# Patient Record
Sex: FEMALE | Race: WHITE
[De-identification: names, ages, dates, MRNs, and addresses within clinical notes are randomized per-mention and may not be internally consistent; named-entity substitution may affect disease eponyms.]

---

## 2019-08-22 NOTE — ER DOCUMENT REPORT
Entered by CARA PETIT SCRIBE  08/22/19 0924 





Acting as scribe for:SIA SUAREZ MD





ED General





- General


Chief Complaint: Lower Abdominal Pain


Stated Complaint: ABDOMIAL PAIN


Time Seen by Provider: 08/22/19 09:17


Notes: 





Patient is a 18-year-old female presenting to the emergency department 

complaining of abdominal pain.  Patient states that the pain began yesterday, 

while she was lying down watching TV.  Patient describes the pain as "muscle 

spasms" or harsh cramping.  Patient states that the pain is constant, she went 

to urgent care for the pain and they told her to go get an ultrasound done.  

Patient states that she has concern of a UTI, she has been experiencing normal 

vaginal discharge, burning, and some nausea.  Patient states that her last 

menstrual period was about 2 weeks ago on August 8.  Patient denies experiencing

any vaginal discharge, vomiting, recent traumas, falls, or having pain like this

before.


TRAVEL OUTSIDE OF THE [place] IN LAST 30 DAYS: No





- Related Data


Allergies/Adverse Reactions: 


                                        





No Known Allergies Allergy (Verified 08/22/19 08:50)


   











Past Medical History





- General


Information source: Patient





- Social History


Smoking Status: Never Smoker


Cigarette use (# per day): No


Chew tobacco use (# tins/day): No


Frequency of alcohol use: None


Drug Abuse: None


Family History: Reviewed & Not Pertinent


Patient has suicidal ideation: No - [facility] [place]


Patient has homicidal ideation: No


Pulmonary Medical History: Reports: Hx Asthma


Musculoskeletal Medical History: Reports Hx Musculoskeletal Trauma


Psychiatric Medical History: Reports: Hx Attention Deficit Hyperactivity 

Disorder, Hx Bipolar Disorder, Hx Depression, Hx Post Traumatic Stress Disorder


Past Surgical History: Reports: Hx Oral Surgery - wisdom, Hx Tonsillectomy





- Immunizations


Immunizations up to date: Yes


Hx Diphtheria, Pertussis, Tetanus Vaccination: Yes





Review of Systems





- Review of Systems


Constitutional: No symptoms reported


EENT: No symptoms reported


Cardiovascular: No symptoms reported


Respiratory: No symptoms reported


Gastrointestinal: See HPI, Abdominal pain, Nausea.  denies: Vomiting


Genitourinary: See HPI, Burning


Female Genitourinary: See HPI, Vaginal discharge


Musculoskeletal: No symptoms reported


Skin: No symptoms reported


Hematologic/Lymphatic: No symptoms reported


Neurological/Psychological: No symptoms reported


-: Yes All other systems reviewed and negative





Physical Exam





- Vital signs


Vitals: 


                                        











Temp Pulse Resp BP Pulse Ox


 


 97.3 F   108 H  16   139/74 H  99 


 


 08/22/19 08:52  08/22/19 08:52  08/22/19 08:52  08/22/19 08:52  08/22/19 08:52














- Notes


Notes: 





Physical Exam:


 


General: Alert, appears well. 


 


HEENT: Normocephalic. Atraumatic. PERRL. Extraocular movements intact. 

Oropharynx clear.


 


Neck: Supple. Non-tender.


 


Respiratory: No respiratory distress. Clear and equal breath sounds bilaterally.


 


Cardiovascular: Regular rate and rhythm. 


 


Abdominal: Normal Inspection. Non-tender. No distension. Normal Bowel Sounds.  

No rebound, no guarding.


 


Back: Non-tender. No deformity or step off.


 


Extremities: Moves all four extremities.


Upper extremities: Normal inspection. Normal ROM.  


Lower extremities: Normal inspection. No edema. Normal ROM.


 


Neurological: Normal cognition. AAOx4. Normal speech.  


 


Psychological: Normal affect. Normal Mood. 


 


Skin: Warm. Dry. Normal color.





Course





- Re-evaluation


Re-evalutation: 





08/22/19 11:05


Patient left the emergency department on her own will saying that it was taking 

too long for her ultrasound.  I was not able to talk to her she left prior to me

being informed.





- Vital Signs


Vital signs: 


                                        











Temp Pulse Resp BP Pulse Ox


 


 97.3 F   108 H  16   139/74 H  99 


 


 08/22/19 08:52  08/22/19 08:52  08/22/19 08:52  08/22/19 08:52  08/22/19 08:52














- Laboratory


Laboratory results interpreted by me: 


                                        











  08/22/19





  09:29


 


Ur Leukocyte Esterase  TRACE H














Discharge





- Discharge


Clinical Impression: 


Abdominal pain


Qualifiers:


 Abdominal location: right upper quadrant Qualified Code(s): R10.11 - Right 

upper quadrant pain





Condition: Good


Disposition: ELOPED





I personally performed the services described in the documentation, reviewed and

edited the documentation which was dictated to the scribe in my presence, and it

accurately records my words and actions.

## 2019-08-23 NOTE — ER DOCUMENT REPORT
ED Medical Screen (RME)





- General


Chief Complaint: Abdominal Pain


Stated Complaint: STOMACH PAIN


Time Seen by Provider: 08/23/19 17:22


Mode of Arrival: Ambulatory


Information source: Patient


Notes: 





18-year-old female presented to ED for complaint of abdominal pain x3 days with 

vomiting starting yesterday.  States she is can keep fluids down but not food.  

She states she is vomited 3 times since yesterday.  She denies any diarrhea.  

She denies any fever.  States her last menstrual cycle was a month ago.  She 

states that she has had some urinary frequency and urgency but that they have a 

urine yesterday and it was negative.  She states she has had vaginal discharge 

but it is white.  She states the pain is on the pelvis and the left abdomen.  

She states she used vapor but does not smoke cigarettes drink or do any drugs.














I have greeted and performed a rapid initial assessment of this patient.  A 

comprehensive ED assessment and evaluation of the patient, analysis of test 

results and completion of medical decision making process will be conducted by 

an additional ED providers.


TRAVEL OUTSIDE OF THE [place] IN LAST 30 DAYS: No





- Related Data


Allergies/Adverse Reactions: 


                                        





No Known Allergies Allergy (Verified 08/22/19 08:50)


   











Past Medical History





- Social History


Chew tobacco use (# tins/day): No


Frequency of alcohol use: None


Drug Abuse: None





- Past Medical History


Cardiac Medical History: 


   Denies: Hx Heart Attack, Hx Hypertension


Pulmonary Medical History: Reports: Hx Asthma


Neurological Medical History: Denies: Hx Cerebrovascular Accident, Hx Seizures


Renal/ Medical History: Denies: Hx Peritoneal Dialysis


GI Medical History: Denies: Hx Hepatitis, Hx Hiatal Hernia, Hx Ulcer


Musculoskeltal Medical History: Reports Hx Musculoskeletal Trauma


Psychiatric Medical History: Reports: Hx Attention Deficit Hyperactivity 

Disorder, Hx Bipolar Disorder, Hx Depression, Hx Post Traumatic Stress Disorder


Infectious Medical History: Denies: Hx Hepatitis


Past Surgical History: Reports: Hx Oral Surgery - wisdom, Hx Tonsillectomy.  

Denies: Hx Mastectomy, Hx Open Heart Surgery, Hx Pacemaker





- Immunizations


Immunizations up to date: Yes


Hx Diphtheria, Pertussis, Tetanus Vaccination: Yes





Physical Exam





- Vital signs


Vitals: 





                                        











Temp Pulse Resp BP Pulse Ox


 


 97.9 F   105 H  12   124/75   97 


 


 08/23/19 17:10  08/23/19 17:10  08/23/19 17:10  08/23/19 17:10  08/23/19 17:10














Course





- Vital Signs


Vital signs: 





                                        











Temp Pulse Resp BP Pulse Ox


 


 97.9 F   105 H  12   124/75   97 


 


 08/23/19 17:10  08/23/19 17:10  08/23/19 17:10  08/23/19 17:10  08/23/19 17:10

## 2019-08-23 NOTE — ER DOCUMENT REPORT
ED General





- General


Chief Complaint: Abdominal Pain


Stated Complaint: STOMACH PAIN


Time Seen by Provider: 08/23/19 17:22


Primary Care Provider: 


BRANDI CINTRON MD [ACTIVE STAFF] - Follow up in 3-5 days


Mode of Arrival: Ambulatory


Information source: Patient, [facility]


Notes: 





18-year-old female with asthma, depression, bipolar disorder presents with 3 

days of left lower quadrant pain that she describes as sharp, intermittent and 

without radiation.  Patient states that she did have associated nausea and 

vomiting that has resolved.  She denies any dysuria, hematuria, vaginal 

discharge.  She is sexually active with her  only and has no concern for 

STD.  She does report recent STD testing in [place].  Patient does report a 

family history of ovarian cysts.  Patient did come to the emergency department 

yesterday but left prior to completion of her evaluation because she stated that

it was taking too long for the ultrasound to be completed.


TRAVEL OUTSIDE OF THE [place] IN LAST 30 DAYS: No





- HPI


Onset: Other


Onset/Duration: Gradual, Intermittent, Better


Quality of pain: Sharp


Severity: Mild


Associated symptoms: Nausea - Resolved, Vomiting - Resolved.  denies: Chest 

pain, Diarrhea, Fever, Shortness of breath, Sweating


Exacerbated by: Denies


Relieved by: Denies


Similar symptoms previously: Yes


Recently seen / treated by doctor: No





- Related Data


Allergies/Adverse Reactions: 


                                        





No Known Allergies Allergy (Verified 08/22/19 08:50)


   











Past Medical History





- General


Information source: Patient





- Social History


Smoking Status: Never Smoker


Chew tobacco use (# tins/day): No


Frequency of alcohol use: None


Drug Abuse: None


Lives with: Spouse/Significant other


Family History: Reviewed & Not Pertinent


Patient has suicidal ideation: No


Patient has homicidal ideation: No





- Past Medical History


Cardiac Medical History: 


   Denies: Hx Heart Attack, Hx Hypertension


Pulmonary Medical History: Reports: Hx Asthma


Neurological Medical History: Denies: Hx Cerebrovascular Accident, Hx Seizures


Renal/ Medical History: Denies: Hx Peritoneal Dialysis


GI Medical History: Denies: Hx Hepatitis, Hx Hiatal Hernia, Hx Ulcer


Musculoskeletal Medical History: Reports Hx Musculoskeletal Trauma


Psychiatric Medical History: Reports: Hx Attention Deficit Hyperactivity 

Disorder, Hx Bipolar Disorder, Hx Depression, Hx Post Traumatic Stress Disorder


Infectious Medical History: Denies: Hx Hepatitis


Past Surgical History: Reports: Hx Oral Surgery - wisdom, Hx Tonsillectomy.  

Denies: Hx Mastectomy, Hx Open Heart Surgery, Hx Pacemaker





- Immunizations


Immunizations up to date: Yes


Hx Diphtheria, Pertussis, Tetanus Vaccination: Yes





Review of Systems





- Review of Systems


Notes: 





REVIEW OF SYSTEMS:


CONSTITUTIONAL :  Denies fever,  chills, or sweats.  Denies recent illness. 

Denies weight loss, recent hospitalizations. 


EENT: Denies visual changes, eye pain.  Denies sore throat, oral lesions, 

difficulty swallowing.


CARDIOVASCULAR:  Denies chest pain.  Denies palpitations. Denies lower extremity

edema.


RESPIRATORY:  Denies cough. Denies shortness of breath, wheezing.


GASTROINTESTINAL:  Denies abdominal  distention.  Denies  diarrhea.  Denies 

blood in vomitus, stools, or per rectum.  Denies black, tarry stools.  Denies 

constipation.  


GENITOURINARY:  Denies difficulty urinating, painful urination,  frequency, 

blood in urine, or  vaginal discharge.


MUSCULOSKELETAL:  Denies back or neck pain or stiffness.  Denies joint pain or 

swelling.


SKIN:   Denies rash, lesions or sores.


HEMATOLOGIC :   Denies easy bruising or bleeding.


LYMPHATIC:  Denies swollen glands.


NEUROLOGICAL:  Denies confusion or altered mental status.  Denies loss of 

consciousness.  Denies dizziness or lightheadedness.  Denies headache.  Denies 

weakness or paralysis.  Denies problems difficulty with ambulation, slurred 

speech.  Denies sensory loss, numbness, or tingling.  Denies seizures.


PSYCHIATRIC:  Denies anxiety or stress.  Denies depression, suicidal ideation, 

or homicidal ideation.  Denies visual or auditory hallucinations.








Physical Exam





- Vital signs


Vitals: 


                                        











Temp Pulse Resp BP Pulse Ox


 


 97.9 F   105 H  12   124/75   97 


 


 08/23/19 17:10  08/23/19 17:10  08/23/19 17:10  08/23/19 17:10  08/23/19 17:10














- Notes


Notes: 





PHYSICAL EXAMINATION:





GENERAL: Well-appearing, well-nourished and in no acute distress.





HEAD: Atraumatic, normocephalic.





EYES: Pupils equal round and reactive to light, extraocular movements intact, 

conjunctiva are normal.





ENT: Nares patent, oropharynx clear without exudates.  Moist mucous membranes.





NECK: Normal range of motion, supple without lymphadenopathy





LUNGS: Breath sounds clear to auscultation bilaterally and equal.  No wheezes 

rales or rhonchi.





HEART: Regular rate and rhythm without murmurs





ABDOMEN: Soft, tenderness with palpation to the left lower quadrant, n

ondistended abdomen.  No guarding, no rebound.  No masses appreciated.





Female : Declined





Musculoskeletal: Normal range of motion, no pitting or edema.  No cyanosis.





NEUROLOGICAL: Cranial nerves grossly intact.  Normal speech, normal gait.  

Normal sensory, motor exams





PSYCH: Normal mood, normal affect.





SKIN: Warm, Dry, normal turgor, no rashes or lesions noted.





Course





- Re-evaluation


Re-evalutation: 





08/23/19 20:21





Laboratory











  08/23/19 08/23/19 08/23/19





  17:30 17:40 17:40


 


WBC   13.1 H 


 


RBC   4.89 


 


Hgb   14.4 


 


Hct   43.6 


 


MCV   89 


 


MCH   29.5 


 


MCHC   33.1 


 


RDW   13.3 


 


Plt Count   286 


 


Lymph % (Auto)   26.0 


 


Mono % (Auto)   7.8 


 


Eos % (Auto)   1.6 


 


Baso % (Auto)   0.6 


 


Absolute Neuts (auto)   8.4 H 


 


Absolute Lymphs (auto)   3.4 


 


Absolute Monos (auto)   1.0 


 


Absolute Eos (auto)   0.2 


 


Absolute Basos (auto)   0.1 


 


Seg Neutrophils %   64.0 


 


Sodium    138.7


 


Potassium    4.3


 


Chloride    104


 


Carbon Dioxide    27


 


Anion Gap    8


 


BUN    14


 


Creatinine    0.73


 


Est GFR ( Amer)    > 60


 


Est GFR (MDRD) Non-Af    > 60


 


Glucose    70 L


 


Calcium    9.1


 


Total Bilirubin    0.3


 


Direct Bilirubin    0.2


 


Neonat Total Bilirubin    Not Reportable


 


Neonat Direct Bilirubin    Not Reportable


 


Neonat Indirect Bili    Not Reportable


 


AST    22


 


ALT    15


 


Alkaline Phosphatase    55


 


Total Protein    6.2 L


 


Albumin    3.8


 


Beta HCG, Quant    < 2.39


 


Total Beta HCG    NEGATIVE


 


Urine Color  YELLOW  


 


Urine Appearance  CLEAR  


 


Urine pH  5.0  


 


Ur Specific Gravity  1.027  


 


Urine Protein  NEGATIVE  


 


Urine Glucose (UA)  NEGATIVE  


 


Urine Ketones  NEGATIVE  


 


Urine Blood  NEGATIVE  


 


Urine Nitrite  NEGATIVE  


 


Urine Bilirubin  NEGATIVE  


 


Urine Urobilinogen  NEGATIVE  


 


Ur Leukocyte Esterase  TRACE H  


 


Urine WBC (Auto)  17  


 


Urine RBC (Auto)  1  


 


Squamous Epi Cells Auto  <1  


 


Urine Mucus (Auto)  FEW  


 


Urine Ascorbic Acid  NEGATIVE  











                                        





Transvaginal US  08/23/19 17:23


IMPRESSION:  3.1 cm complex left ovarian cyst, possibly hemorrhagic.


 











                                        











Temp Pulse Resp BP Pulse Ox


 


 97.9 F   105 H  12   124/75   97 


 


 08/23/19 17:10  08/23/19 17:10  08/23/19 17:10  08/23/19 17:10  08/23/19 17:10








18-year-old female presents with left lower quadrant abdominal pain for 3 days. 

Vitals were reviewed upon arrival and patient is mildly tachycardic but 

afebrile.  She does not appear toxic or dehydrated.  She is in no acute 

distress.  Exam is significant for mild tenderness with palpation to the left 

lower quadrant without guarding or rebound.  CBC, CMP, urinalysis and pregnancy 

tests are unremarkable.  Transvaginal ultrasound was obtained and does show a 

3.1 cm complex left ovarian cyst which is possibly hemorrhagic.  Patient did 

receive IM Toradol.  She states she has nausea medication at home.  She was 

provided copies of her ultrasound and instructed that she must follow-up with 

OB/GYN.  Patient was discharged home with instructions to take Motrin, Norco for

severe pain.  Patient is agreeable and requesting discharge home as she states 

that "I am very hungry and tired of being here".








Patient was evaluated and treated as appropriate for the patient's presenting 

symptoms and complaint, with consideration of any critical or life threatening 

conditions that may be associated with their obtained history and exam as noted 

above. All results were discussed with patient. Patient provided the opportunity

to ask questions, and express concerns. Patient was educated on treatments based

on their presumed diagnosis as noted above.  At this time we will discharge the 

patient with return precautions and follow-up recommendations.  Verbal discharge

instructions given a the bedside. Medication warnings reviewed. Patient is in 

agreement with this plan and has verbalized understanding of return precautions.







After careful consideration I feel that that patient can be safely discharged 

from the emergency department,  they were advised to followup with a primary 

care physician in 2-3 days. 








Dictation on this chart was performed using voice recognition software and may 

result in unintended grammatical, spelling, syntax or errors.

















- Vital Signs


Vital signs: 


                                        











Temp Pulse Resp BP Pulse Ox


 


 97.9 F   105 H  12   124/75   97 


 


 08/23/19 17:10  08/23/19 17:10  08/23/19 17:10  08/23/19 17:10  08/23/19 17:10














- Laboratory


Result Diagrams: 


                                 08/23/19 17:40





                                 08/23/19 17:40


Laboratory results interpreted by me: 


                                        











  08/23/19 08/23/19 08/23/19





  17:30 17:40 17:40


 


WBC   13.1 H 


 


Absolute Neuts (auto)   8.4 H 


 


Glucose    70 L


 


Total Protein    6.2 L


 


Ur Leukocyte Esterase  TRACE H  














- Diagnostic Test


Radiology reviewed: Image reviewed, Reports reviewed





Discharge





- Discharge


Clinical Impression: 


 Left lower quadrant abdominal pain, Hemorrhagic cyst of left ovary





Condition: Good


Disposition: HOME, SELF-CARE


Instructions:  Abdominal Pain ([facility]), Nausea or Vomiting, Nonspecific ([facility]), 

Ovarian Cyst ([facility]), Toradol Injection ([facility])


Additional Instructions: 


Today been diagnosed with an ovarian cyst.  These typically occur in the middle 

of your typical menstrual cycle.  The pain should last for no more than 3-4 

days.  For your pain: Take ibuprofen 600 mg and acetaminophen 1000 mg every 6 

hours together as needed for pain.  If this does not control your pain you may 

take 1 Norco every 6 hours hours as needed.  Please be very careful about using 

the oral Norco and only use this for severe pain.  Please follow-up with your 

OB/GYN regarding today's visit.  If you have multiple recurrent cyst that 

continue to cause you pain like this, you may require hormone therapy such as 

oral birth control pills to prevent recurrence of the same.  Return if you 

develop fever, nausea, vomiting, worsening abdominal pain, pass out, or have any

other symptoms that are worrisome to you.


Prescriptions: 


Hydrocodone/Acetaminophen [Norco 5-325 mg Tablet] 1 tab PO Q6H #10 tablet


Forms:  Smoking Cessation Education


Referrals: 


BRANDI CINTRON MD [ACTIVE STAFF] - Follow up in 3-5 days

## 2019-08-23 NOTE — RADIOLOGY REPORT (SQ)
EXAM DESCRIPTION:  U/S NON-OB PELVIS TV W/O DOP



COMPLETED DATE/TIME:  8/23/2019 6:11 pm



REASON FOR STUDY:  bilateral pelvic pain



COMPARISON:  None.



TECHNIQUE:  Dynamic and static grayscale images acquired of the pelvis via transvaginal approach and 
recorded on PACS. Additional selected color Doppler and spectral images recorded.



LIMITATIONS:  None.



FINDINGS:  UTERUS: Contour normal.  No mass.

ENDOMETRIAL STRIPE: No focal or generalized thickening. No masses.

CERVIX: No nabothian cysts.

RIGHT OVARY AND DOPPLER: Normal size.  2.7 cm cyst. Normal arterial vascular flow without evidence fo
r torsion.

LEFT OVARY AND DOPPLER: Normal size.  3.1 cm complex cyst, possibly hemorrhagic.  Normal arterial vas
cular flow without evidence for torsion.

FREE FLUID: None noted.

OTHER: No other significant finding.

MEASUREMENTS:

UTERUS: 7.2 x 4.3 x 3.2 cm

ENDOMETRIAL STRIPE: 7 mm

RIGHT OVARY: 3.5 x 4.1 x 3.1 cm

LEFT OVARY: 3.5 x 3.1 x 3.1 cm



IMPRESSION:  3.1 cm complex left ovarian cyst, possibly hemorrhagic.



TECHNICAL DOCUMENTATION:  JOB ID:  [id number]

[id number]

 2011 [employer]- All Rights Reserved                          Rev-[id number]



Reading location - IP/workstation name: [email or web address]

## 2020-01-02 ENCOUNTER — HOSPITAL ENCOUNTER (EMERGENCY)
Dept: HOSPITAL 62 - ER | Age: 20
LOS: 1 days | Discharge: HOME | End: 2020-01-03
Payer: MEDICAID

## 2020-01-02 DIAGNOSIS — Z32.02: ICD-10-CM

## 2020-01-02 DIAGNOSIS — K76.0: ICD-10-CM

## 2020-01-02 DIAGNOSIS — E66.9: ICD-10-CM

## 2020-01-02 DIAGNOSIS — N91.1: Primary | ICD-10-CM

## 2020-01-02 DIAGNOSIS — J45.909: ICD-10-CM

## 2020-01-02 DIAGNOSIS — R10.11: ICD-10-CM

## 2020-01-02 LAB
ADD MANUAL DIFF: NO
ALBUMIN SERPL-MCNC: 4.8 G/DL (ref 3.5–5)
ALP SERPL-CCNC: 70 U/L (ref 38–126)
ANION GAP SERPL CALC-SCNC: 12 MMOL/L (ref 5–19)
APPEARANCE UR: (no result)
APTT PPP: YELLOW S
AST SERPL-CCNC: 29 U/L (ref 14–36)
BASOPHILS # BLD AUTO: 0 10^3/UL (ref 0–0.2)
BASOPHILS NFR BLD AUTO: 0.4 % (ref 0–2)
BILIRUB DIRECT SERPL-MCNC: 0.3 MG/DL (ref 0–0.4)
BILIRUB SERPL-MCNC: 0.3 MG/DL (ref 0.2–1.3)
BILIRUB UR QL STRIP: NEGATIVE
BUN SERPL-MCNC: 21 MG/DL (ref 7–20)
CALCIUM: 9.9 MG/DL (ref 8.4–10.2)
CHLORIDE SERPL-SCNC: 101 MMOL/L (ref 98–107)
CO2 SERPL-SCNC: 28 MMOL/L (ref 22–30)
EOSINOPHIL # BLD AUTO: 0.1 10^3/UL (ref 0–0.6)
EOSINOPHIL NFR BLD AUTO: 0.9 % (ref 0–6)
ERYTHROCYTE [DISTWIDTH] IN BLOOD BY AUTOMATED COUNT: 13.5 % (ref 11.5–14)
GLUCOSE SERPL-MCNC: 82 MG/DL (ref 75–110)
GLUCOSE UR STRIP-MCNC: NEGATIVE MG/DL
HCT VFR BLD CALC: 41.3 % (ref 36–47)
HGB BLD-MCNC: 14 G/DL (ref 12–15.5)
KETONES UR STRIP-MCNC: NEGATIVE MG/DL
LYMPHOCYTES # BLD AUTO: 2.4 10^3/UL (ref 0.5–4.7)
LYMPHOCYTES NFR BLD AUTO: 25.1 % (ref 13–45)
MCH RBC QN AUTO: 29.6 PG (ref 27–33.4)
MCHC RBC AUTO-ENTMCNC: 34 G/DL (ref 32–36)
MCV RBC AUTO: 87 FL (ref 80–97)
MONOCYTES # BLD AUTO: 0.7 10^3/UL (ref 0.1–1.4)
MONOCYTES NFR BLD AUTO: 7.5 % (ref 3–13)
NEUTROPHILS # BLD AUTO: 6.4 10^3/UL (ref 1.7–8.2)
NEUTS SEG NFR BLD AUTO: 66.1 % (ref 42–78)
PH UR STRIP: 6 [PH] (ref 5–9)
PLATELET # BLD: 263 10^3/UL (ref 150–450)
POTASSIUM SERPL-SCNC: 4.6 MMOL/L (ref 3.6–5)
PROT SERPL-MCNC: 8 G/DL (ref 6.3–8.2)
PROT UR STRIP-MCNC: 30 MG/DL
RBC # BLD AUTO: 4.73 10^6/UL (ref 3.72–5.28)
SP GR UR STRIP: 1.03
TOTAL CELLS COUNTED % (AUTO): 100 %
UROBILINOGEN UR-MCNC: NEGATIVE MG/DL (ref ?–2)
WBC # BLD AUTO: 9.6 10^3/UL (ref 4–10.5)

## 2020-01-02 PROCEDURE — 99284 EMERGENCY DEPT VISIT MOD MDM: CPT

## 2020-01-02 PROCEDURE — 85025 COMPLETE CBC W/AUTO DIFF WBC: CPT

## 2020-01-02 PROCEDURE — 84702 CHORIONIC GONADOTROPIN TEST: CPT

## 2020-01-02 PROCEDURE — 76705 ECHO EXAM OF ABDOMEN: CPT

## 2020-01-02 PROCEDURE — 36415 COLL VENOUS BLD VENIPUNCTURE: CPT

## 2020-01-02 PROCEDURE — 80053 COMPREHEN METABOLIC PANEL: CPT

## 2020-01-02 PROCEDURE — 81001 URINALYSIS AUTO W/SCOPE: CPT

## 2020-01-02 PROCEDURE — 83690 ASSAY OF LIPASE: CPT

## 2020-01-02 PROCEDURE — 96360 HYDRATION IV INFUSION INIT: CPT

## 2020-01-02 NOTE — ER DOCUMENT REPORT
ED Medical Screen (RME)





- General


Chief Complaint: Vomiting


Stated Complaint: VOMITING


Time Seen by Provider: 01/02/20 21:11


TRAVEL OUTSIDE OF THE [place] IN LAST 30 DAYS: No





- HPI


Notes: 





01/02/20 21:14


Patient is a 19-year-old female no significant past medical history presents 

complaining of nausea, vomiting, right upper quadrant abdominal pain that began 

over the past several days.  Patient states that she is 2 months late on her 

menstrual cycle and had questionable urine pregnancy test at the health 

department.  Denies drug allergies.  No fever.





I have treated and performed a rapid initial assessment of this patient.  A 

comprehensive ED assessment and evaluation of the patient, analysis of test 

results and completion of medical decision making process will be conducted by 

additional ED providers.





PHYSICAL EXAMINATION:





GENERAL: Well-appearing, well-nourished and in no acute distress.  A&Ox4.  

Answers questions appropriately.


Abdomen: Limited exam in triage, mild tenderness right upper qu

adrant/epigastrium. No CVA tenderness.





- Related Data


Allergies/Adverse Reactions: 


                                        





No Known Allergies Allergy (Verified 01/02/20 21:09)


   











Past Medical History





- Past Medical History


Cardiac Medical History: 


   Denies: Hx Heart Attack, Hx Hypertension


Pulmonary Medical History: Reports: Hx Asthma


Neurological Medical History: Denies: Hx Cerebrovascular Accident, Hx Seizures


Renal/ Medical History: Denies: Hx Peritoneal Dialysis


GI Medical History: Denies: Hx Hepatitis, Hx Hiatal Hernia, Hx Ulcer


Musculoskeltal Medical History: Reports Hx Musculoskeletal Trauma


Psychiatric Medical History: Reports: Hx Attention Deficit Hyperactivity 

Disorder, Hx Bipolar Disorder, Hx Depression, Hx Post Traumatic Stress Disorder


Infectious Medical History: Denies: Hx Hepatitis


Past Surgical History: Reports: Hx Oral Surgery - wisdom, Hx Tonsillectomy.  

Denies: Hx Mastectomy, Hx Open Heart Surgery, Hx Pacemaker





- Immunizations


Immunizations up to date: Yes


Hx Diphtheria, Pertussis, Tetanus Vaccination: Yes





Physical Exam





- Vital signs


Vitals: 





                                        











Temp Pulse Resp BP Pulse Ox


 


 97.8 F   108 H  16   149/72 H  98 


 


 01/02/20 19:31  01/02/20 19:31  01/02/20 19:31  01/02/20 19:31  01/02/20 19:31














Course





- Vital Signs


Vital signs: 





                                        











Temp Pulse Resp BP Pulse Ox


 


 97.8 F   108 H  16   149/72 H  98 


 


 01/02/20 19:31  01/02/20 19:31  01/02/20 19:31  01/02/20 19:31  01/02/20 19:31

## 2020-01-02 NOTE — RADIOLOGY REPORT (SQ)
CLINICAL HISTORY:  RUQ pain 



COMPARISON: None.



TECHNIQUE: US ABDOMEN LIMITED on 1/2/2020 9:13 PM CST



FINDINGS: 



Liver is fatty in echotexture. Common bile but measures 4 mm.

Portal vein is patent. Gallbladder is slightly contracted without

wall thickening, stones or pericholecystic fluid. Right kidney

measures 9.7 cm.



IMPRESSION: 



Hepatic steatosis. Otherwise unremarkable study.

## 2020-01-03 VITALS — SYSTOLIC BLOOD PRESSURE: 118 MMHG | DIASTOLIC BLOOD PRESSURE: 62 MMHG

## 2020-01-03 NOTE — ER DOCUMENT REPORT
ED General





- General


Chief Complaint: Abdominal Pain


Stated Complaint: VOMITING


Time Seen by Provider: 01/02/20 21:11


TRAVEL OUTSIDE OF THE [place] IN LAST 30 DAYS: No





- HPI


Notes: 





Ms. Gordon is a 19-year-old nulligravida who has not had a menstrual period in 

2 months.  She went to the health department and was told that she had 

"indeterminate" pregnancy test.  She basically came in here tonight because she 

went to find out if she was pregnant or not.  She reports that she has been 

having some unusual cravings for food.  She is not on any contraception.  Takes 

no regular medications.  No known allergies.  Says she gets some occasional 

bloating and indigestion.  She has intermittently complained of some cramping in

right upper quadrant.











- Related Data


Allergies/Adverse Reactions: 


                                        





No Known Allergies Allergy (Verified 01/02/20 22:35)


   











Past Medical History





- General


Information source: Patient, Relative





- Social History


Smoking Status: Never Smoker


Family History: Reviewed & Not Pertinent


Patient has suicidal ideation: No


Patient has homicidal ideation: No





- Past Medical History


Cardiac Medical History: 


   Denies: Hx Heart Attack, Hx Hypertension


Pulmonary Medical History: Reports: Hx Asthma


Neurological Medical History: Denies: Hx Cerebrovascular Accident, Hx Seizures


Renal/ Medical History: Denies: Hx Peritoneal Dialysis


GI Medical History: Denies: Hx Hepatitis, Hx Hiatal Hernia, Hx Ulcer


Musculoskeletal Medical History: Reports Hx Musculoskeletal Trauma


Psychiatric Medical History: Reports: Hx Attention Deficit Hyperactivity 

Disorder, Hx Bipolar Disorder, Hx Depression, Hx Post Traumatic Stress Disorder


Infectious Medical History: Denies: Hx Hepatitis


Past Surgical History: Reports: Hx Oral Surgery - wisdom, Hx Tonsillectomy.  

Denies: Hx Mastectomy, Hx Open Heart Surgery, Hx Pacemaker





- Immunizations


Immunizations up to date: Yes


Hx Diphtheria, Pertussis, Tetanus Vaccination: Yes





Review of Systems





- Review of Systems


Notes: 





Constitutional: Negative for fever.


HENT: Negative for sore throat.


Eyes: Negative for visual changes.


Cardiovascular: Negative for chest pain.


Respiratory: Negative for shortness of breath.


Gastrointestinal: As per HPI.


Genitourinary: As per HPI.


Musculoskeletal: Negative for back pain.


Skin: Negative for rash.


Neurological: Negative for headaches, weakness or numbness.





10 point ROS negative except as marked above and in HPI.








Physical Exam





- Vital signs


Vitals: 





                                        











Temp Pulse Resp BP Pulse Ox


 


 97.8 F   108 H  16   149/72 H  98 


 


 01/02/20 19:31  01/02/20 19:31  01/02/20 19:31  01/02/20 19:31  01/02/20 19:31














- Notes


Notes: 











GENERAL: Somewhat obese female of approximately stated age well-developed well-

nourished appearing in no acute distress.





SKIN: Good turgor no rashes.





HEAD: Normocephalic atraumatic.





EYES: PERRLA.  EOMI.  Conjunctivae and sclerae clear.





EARS: CANALS AND TMS CLEAR.





NOSE: CLEAR.





MOUTH: Moist mucosa.  Good dentition.  No stridor or edema.  No drooling.





NECK: Supple.  No masses or thyromegaly.  No adenopathy.  Carotids 2+ without 

bruits.  No JVD.





BACK: Symmetrical without tenderness.





CHEST: Respirations unlabored.  Breath sounds clear and symmetrical.





HEART: Regular rhythm.  No murmur gallop or rub.





ABDOMEN: Obese.  Soft nontender without masses, organomegaly or rebound.  Bowel 

sounds normally active.  No bruits.





GENITALIA: Deferred.





EXTREMITIES: No edema.  No calf tenderness.  Cap refill less than 1.5 seconds.  

Dorsalis pedis and posterior tibial pulses 3+ and symmetrical.





NEUROLOGICAL: GCS 15.  Alert and oriented x3.  Normal gait.  Fluent speech.  

Cranial nerves II through XII intact.  Sensorimotor and cerebellar normal.  

Normal tone.





PSYCHIATRIC: Appropriate affect.





Course





- Re-evaluation


Re-evalutation: 





01/03/20 00:05


Pregnancy test is negative here.  Abdominal ultrasound has been ordered by the 

midlevel provider for saw this patient this was reported by the radiologist as 

showing some hepatic steatosis.  I talked to the patient and her male  

and recommended outpatient follow-up with a gynecologist regarding her secondary

amenorrhea.





- Vital Signs


Vital signs: 





                                        











Temp Pulse Resp BP Pulse Ox


 


 97.8 F   108 H  16   149/72 H  98 


 


 01/02/20 19:31  01/02/20 19:31  01/02/20 19:31  01/02/20 19:31  01/02/20 19:31














- Laboratory


Result Diagrams: 


                                 01/02/20 21:35





                                 01/02/20 21:35


Laboratory results interpreted by me: 





                                        











  01/02/20 01/02/20





  21:35 21:35


 


BUN  21 H 


 


Urine Protein   30 H


 


Leukocyte Esterase Rfl   TRACE H














Discharge





- Discharge


Clinical Impression: 


 Secondary amenorrhea





Condition: Stable


Disposition: HOME, SELF-CARE


Additional Instructions: 


Follow-up with referral gynecologist.


Referrals: 


KENZIE CASTILLO MD [ACTIVE STAFF] - Follow up as needed

## 2020-01-19 ENCOUNTER — HOSPITAL ENCOUNTER (EMERGENCY)
Dept: HOSPITAL 62 - ER | Age: 20
Discharge: HOME | End: 2020-01-19
Payer: MEDICAID

## 2020-01-19 VITALS — DIASTOLIC BLOOD PRESSURE: 75 MMHG | SYSTOLIC BLOOD PRESSURE: 109 MMHG

## 2020-01-19 DIAGNOSIS — J45.909: ICD-10-CM

## 2020-01-19 DIAGNOSIS — F17.200: ICD-10-CM
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
## 2020-01-19 DIAGNOSIS — M25.531: Primary | ICD-10-CM

## 2020-01-19 DIAGNOSIS — I10: ICD-10-CM

## 2020-01-19 PROCEDURE — L3908 WHO COCK-UP NONMOLDE PRE OTS: HCPCS

## 2020-01-19 PROCEDURE — 99283 EMERGENCY DEPT VISIT LOW MDM: CPT

## 2020-01-19 PROCEDURE — 73110 X-RAY EXAM OF WRIST: CPT

## 2020-01-19 NOTE — RADIOLOGY REPORT (SQ)
EXAM DESCRIPTION:  WRIST RIGHT 3 VIEWS



COMPLETED DATE/TIME:  1/19/2020 6:33 pm



REASON FOR STUDY:  rt ulnar wrist pain s/p injury



COMPARISON:  None.



EXAM PARAMETERS:  NUMBER OF VIEWS: Three views.

TECHNIQUE: AP, lateral and oblique  radiographic images acquired of the right wrist.

LIMITATIONS: None.



FINDINGS:  MINERALIZATION: Normal.

BONES: No acute fracture or dislocation.  No worrisome bone lesions.

JOINTS: No effusion.

SOFT TISSUES: No significant soft tissue swelling.  No radiopaque foreign body.

OTHER: No other significant finding.



IMPRESSION:  NO FRACTURE.



TECHNICAL DOCUMENTATION:  JOB ID:  [id number]

[id number]

 2011 [employer]- All Rights Reserved



Reading location - IP/workstation name: [employer]

## 2020-01-19 NOTE — ER DOCUMENT REPORT
HPI





- HPI


Time Seen by Provider: 01/19/20 18:03


Pain Level: 1


Notes: 





Patient is a 19-year-old female no significant past medical history aside from 

asthma who presents complaining of right wrist pain status post injury 

yesterday.  Patient states that she hit her wrist off of a desk.  She has had 

some pain in that area since then but does not radiate.  Denies drug allergies. 

She does not want any Tylenol or Motrin at this time.  Denies any headache, 

fever, URI, sore throat, chest pain, palpitations, syncope, cough, shortness of 

breath, wheeze, dyspnea, abdominal pain, nausea/vomiting/diarrhea, urinary 

retention, dysuria, hematuria, loss of control of bowel or bladder, 

numbness/tingling, muscle paralysis/weakness, or rash.





- ROS


Systems Reviewed and Negative: Yes All other systems reviewed and negative





- REPRODUCTIVE


Reproductive: DENIES: Pregnant:





Past Medical History





- Social History


Smoking Status: Current Some Day Smoker


Family History: Reviewed & Not Pertinent


Patient has suicidal ideation: No


Patient has homicidal ideation: No





- Past Medical History


Cardiac Medical History: 


   Denies: Hx Heart Attack, Hx Hypertension


Pulmonary Medical History: Reports: Hx Asthma


Neurological Medical History: Denies: Hx Cerebrovascular Accident, Hx Seizures


Renal/ Medical History: Denies: Hx Peritoneal Dialysis


GI Medical History: Denies: Hx Hepatitis, Hx Hiatal Hernia, Hx Ulcer


Musculoskeletal Medical History: Reports Hx Musculoskeletal Trauma


Psychiatric Medical History: Reports: Hx Attention Deficit Hyperactivity 

Disorder, Hx Bipolar Disorder, Hx Depression, Hx Post Traumatic Stress Disorder


Infectious Medical History: Denies: Hx Hepatitis


Past Surgical History: Reports: Hx Oral Surgery - wisdom, Hx Tonsillectomy.  

Denies: Hx Mastectomy, Hx Open Heart Surgery, Hx Pacemaker





- Immunizations


Immunizations up to date: Yes


Hx Diphtheria, Pertussis, Tetanus Vaccination: Yes





Vertical Provider Document





- CONSTITUTIONAL


Agree With Documented VS: Yes


Notes: 





PHYSICAL EXAMINATION:





GENERAL: Well-appearing, well-nourished and in no acute distress.





HEAD: Atraumatic, normocephalic.





NECK: Normal range of motion, supple without lymphadenopathy.  No midline 

tenderness.





LUNGS: Breath sounds clear to auscultation bilaterally and equal.  No wheezes 

rales or rhonchi.





HEART: Regular rate and rhythm without murmurs, rubs, gallops.





Musculoskeletal: Rt hand/wrist: No erythema, warmth, ecchymosis, deformity, or 

swelling noted.  N/V intact distal.  FROM to passive/active at the wrist. 

Strength 5+/5 to .  No scaphoid tenderness.  + tenderness distal ulnar wrist

to palp.  No other bony tenderness.  Gamekeeper negative.





Extremities:  No cyanosis, clubbing, or edema b/l.  Peripheral pulses 2+.  

Capillary refill less than 3 seconds.





NEUROLOGICAL: Normal speech, normal gait.  Normal sensory, motor exams otherwise

unremarkable





PSYCH: Normal mood, normal affect.





SKIN: see above.  No rash





- INFECTION CONTROL


TRAVEL OUTSIDE OF THE [place] IN LAST 30 DAYS: No





Course





- Re-evaluation


Re-evalutation: 





01/19/20 


Patient is an afebrile, well-hydrated, 19-year-old female who presents to the ED

with rt wrist pain which I suspect to be a contusion.  Vitals are acceptable 

without any significant tachycardia, tachypnea, or hypoxia.  PE is otherwise 

unremarkable for any neurovascular compromise, obvious tendon/ligament rupture, 

obvious fracture/dislocation, septic joint.  X-ray was unremarkable for any 

acute pathology.  Cock up splint provided today.  Patient declined any Tylenol 

or ice.  Patient is nontoxic-appearing.  No other labs or imaging warranted at 

this time based on H&P.  Conservative measures otherwise for symptoms.  Recheck 

with your PCM in 3-5 days.  Consider consult orthopedics.  Return to the ED with

any worsening/concerning symptoms otherwise as reviewed in discharge.  Patient 

is in agreement.





- Vital Signs


Vital signs: 


                                        











Temp Pulse Resp BP Pulse Ox


 


 98.4 F   95   20   109/75   100 


 


 01/19/20 17:48  01/19/20 17:48  01/19/20 17:48  01/19/20 17:48  01/19/20 17:48














Discharge





- Discharge


Clinical Impression: 


 Right wrist pain





Condition: Stable


Disposition: HOME, SELF-CARE


Additional Instructions: 


Rest, Ice, Compression, Elevation


Tylenol/ibuprofen as needed


Light stretches daily


Strength exercises as able


Moist heat and massage may help


F/u with your PCP in 3-5 days for a recheck


Consider consult(s) with Orthopedics/physical therapy for ongoing/worsening 

symptoms





Return to the ED with any worsening symptoms and/or development of fever, 

headache, chest pain, palpitations, syncope, shortness of breath, trouble 

breathing, abdominal pain, n/v/d, muscle weakness/paralysis, numbness/tingling, 

swelling, redness, or other worsening symptoms that are concerning to you.


Referrals: 


[facility] [Provider Group] - Follow up as needed

## 2020-03-21 ENCOUNTER — HOSPITAL ENCOUNTER (EMERGENCY)
Dept: HOSPITAL 62 - ER | Age: 20
Discharge: HOME | End: 2020-03-21
Payer: MEDICAID

## 2020-03-21 VITALS — SYSTOLIC BLOOD PRESSURE: 128 MMHG | DIASTOLIC BLOOD PRESSURE: 77 MMHG

## 2020-03-21 DIAGNOSIS — J45.909: ICD-10-CM

## 2020-03-21 DIAGNOSIS — N94.0: Primary | ICD-10-CM

## 2020-03-21 DIAGNOSIS — F17.210: ICD-10-CM

## 2020-03-21 LAB
APPEARANCE UR: CLEAR
APTT PPP: YELLOW S
BILIRUB UR QL STRIP: NEGATIVE
GLUCOSE UR STRIP-MCNC: NEGATIVE MG/DL
KETONES UR STRIP-MCNC: NEGATIVE MG/DL
NITRITE UR QL STRIP: NEGATIVE
PH UR STRIP: 7 [PH] (ref 5–9)
PROT UR STRIP-MCNC: 30 MG/DL
SP GR UR STRIP: 1.03
UROBILINOGEN UR-MCNC: NEGATIVE MG/DL (ref ?–2)

## 2020-03-21 PROCEDURE — 81025 URINE PREGNANCY TEST: CPT

## 2020-03-21 PROCEDURE — 81001 URINALYSIS AUTO W/SCOPE: CPT

## 2020-03-21 PROCEDURE — 99284 EMERGENCY DEPT VISIT MOD MDM: CPT

## 2020-03-21 NOTE — ER DOCUMENT REPORT
HPI





- HPI


Time Seen by Provider: 03/21/20 17:59


Onset: Last week


Onset/Duration: Waxing and waning


Quality of pain: Achy


Associated Symptoms: None


Notes: 





This 19-year-old female presented to the emergency room today stating that she 

last had a full menstrual period November she spotted in January she has not had

a menstrual period since she is historically regular on her menstrual.  States s

he followed up with her OB/GYN in January and has not followed up since.





- REPRODUCTIVE


Reproductive: DENIES: Pregnant:





Past Medical History





- Social History


Smoking Status: Current Every Day Smoker


Cigarette use (# per day): Yes


Chew tobacco use (# tins/day): No


Smoking Education Provided: No


Family History: Reviewed & Not Pertinent





- Past Medical History


Cardiac Medical History: 


   Denies: Hx Heart Attack, Hx Hypertension


Pulmonary Medical History: Reports: Hx Asthma


Neurological Medical History: Denies: Hx Cerebrovascular Accident, Hx Seizures


Renal/ Medical History: Denies: Hx Peritoneal Dialysis


GI Medical History: Denies: Hx Hepatitis, Hx Hiatal Hernia, Hx Ulcer


Musculoskeletal Medical History: Reports Hx Musculoskeletal Trauma


Psychiatric Medical History: Reports: Hx Attention Deficit Hyperactivity 

Disorder, Hx Bipolar Disorder, Hx Depression, Hx Post Traumatic Stress Disorder


Infectious Medical History: Denies: Hx Hepatitis


Past Surgical History: Reports: Hx Oral Surgery - wisdom, Hx Tonsillectomy.  

Denies: Hx Mastectomy, Hx Open Heart Surgery, Hx Pacemaker





- Immunizations


Immunizations up to date: Yes


Hx Diphtheria, Pertussis, Tetanus Vaccination: Yes





Vertical Provider Document





- CONSTITUTIONAL


Exam Limitations: No Limitations





- INFECTION CONTROL


TRAVEL OUTSIDE OF THE [place] IN LAST 30 DAYS: No





- HEENT


HEENT: Atraumatic, Conjuctival Injection, Normocephalic, PERRLA





- NECK


Neck: Normal Inspection





- RESPIRATORY


Respiratory: Breath Sounds Normal





- CARDIOVASCULAR


Cardiovascular: Regular Rate, Regular Rhythm





Course





- Vital Signs


Vital signs: 


                                        











Temp Pulse Resp BP Pulse Ox


 


 97.8 F   93   18   128/77 H  98 


 


 03/21/20 17:59  03/21/20 17:59  03/21/20 17:59  03/21/20 17:59  03/21/20 17:59














- Laboratory


Laboratory results interpreted by me: 





03/21/20 18:55


                              Labs- All tests 24 hr











  03/21/20





  18:23


 


Urine Color  YELLOW


 


Urine Appearance  CLEAR


 


Urine pH  7.0


 


Ur Specific Gravity  1.030


 


Urine Protein  30 H


 


Urine Glucose (UA)  NEGATIVE


 


Urine Ketones  NEGATIVE


 


Urine Blood  NEGATIVE


 


Urine Nitrite  NEGATIVE


 


Urine Bilirubin  NEGATIVE


 


Urine Urobilinogen  NEGATIVE


 


Ur Leukocyte Esterase  NEGATIVE


 


Urine WBC (Auto)  2


 


Urine RBC (Auto)  2


 


Squamous Epi Cells Auto  1


 


Urine Mucus (Auto)  MANY


 


Urine Ascorbic Acid  NEGATIVE


 


Urine HCG, Qual  NEGATIVE














Discharge





- Discharge


Clinical Impression: 


 Mittelschmerz phenomenon





Condition: Good


Disposition: HOME, SELF-CARE


Additional Instructions: 


Must follow-up with OB/GYN in 2 to 3 days.


Increase fluid intake rest return to the emergency room for any change worsening

condition.


Medicine as prescribed.  Return for any change worsening condition.


Prescriptions: 


Naproxen Sodium [Naproxen Sodium ER] 500 mg PO Q12 PRN #20 tablet.sa


 PRN Reason:

## 2020-04-01 ENCOUNTER — HOSPITAL ENCOUNTER (EMERGENCY)
Dept: HOSPITAL 62 - ER | Age: 20
Discharge: HOME | End: 2020-04-01
Payer: MEDICAID

## 2020-04-01 VITALS — DIASTOLIC BLOOD PRESSURE: 73 MMHG | SYSTOLIC BLOOD PRESSURE: 120 MMHG

## 2020-04-01 DIAGNOSIS — H10.11: Primary | ICD-10-CM

## 2020-04-01 DIAGNOSIS — J45.909: ICD-10-CM

## 2020-04-01 DIAGNOSIS — H57.11: ICD-10-CM

## 2020-04-01 PROCEDURE — 99282 EMERGENCY DEPT VISIT SF MDM: CPT

## 2020-04-01 NOTE — ER DOCUMENT REPORT
ED General





- General


Mode of Arrival: Ambulatory


Information source: Patient


TRAVEL OUTSIDE OF THE [place] IN LAST 30 DAYS: No





- General


Chief Complaint: Eye Problem


Stated Complaint: EYE IRRITATION


Time Seen by Provider: 04/01/20 08:44


Primary Care Provider: 


CHUCK NIELSEN MD [ACTIVE STAFF] - Follow up in 3-5 days





- Related Data


Allergies/Adverse Reactions: 


                                        





No Known Allergies Allergy (Verified 04/01/20 08:31)


   











Past Medical History





- Social History


Smoking Status: Never Smoker


Chew tobacco use (# tins/day): No


Frequency of alcohol use: None


Drug Abuse: None


Family History: Reviewed & Not Pertinent


Patient has suicidal ideation: No


Patient has homicidal ideation: No





- Past Medical History


Cardiac Medical History: 


   Denies: Hx Heart Attack, Hx Hypertension


Pulmonary Medical History: Reports: Hx Asthma


Neurological Medical History: Denies: Hx Cerebrovascular Accident, Hx Seizures


Renal/ Medical History: Denies: Hx Peritoneal Dialysis


GI Medical History: Denies: Hx Hepatitis, Hx Hiatal Hernia, Hx Ulcer


Musculoskeletal Medical History: Reports Hx Musculoskeletal Trauma


Psychiatric Medical History: Reports: Hx Attention Deficit Hyperactivity 

Disorder, Hx Bipolar Disorder, Hx Depression, Hx Post Traumatic Stress Disorder


Infectious Medical History: Denies: Hx Hepatitis


Past Surgical History: Reports: Hx Oral Surgery - wisdom, Hx Tonsillectomy.  

Denies: Hx Mastectomy, Hx Open Heart Surgery, Hx Pacemaker





- Immunizations


Immunizations up to date: Yes


Hx Diphtheria, Pertussis, Tetanus Vaccination: Yes





Physical Exam





- Vital signs


Vitals: 





                                        











Temp Pulse Resp BP Pulse Ox


 


 98.2 F   101 H  18   140/79 H  100 


 


 04/01/20 08:16  04/01/20 08:16  04/01/20 08:16  04/01/20 08:16  04/01/20 08:16














Course





- Vital Signs


Vital signs: 





                                        











Temp Pulse Resp BP Pulse Ox


 


 98.2 F   101 H  18   140/79 H  100 


 


 04/01/20 08:16  04/01/20 08:16  04/01/20 08:16  04/01/20 08:16  04/01/20 08:16














Discharge





- Discharge


Clinical Impression: 


Allergic conjunctivitis


Qualifiers:


 Laterality: right Qualified Code(s): H10.11 - Acute atopic conjunctivitis, 

right eye





Condition: Stable


Disposition: HOME, SELF-CARE


Instructions:  Conjunctivitis, Allergic


Additional Instructions: 


Your exam is most consistent with allergic conjunctivitis.  At this time I have 

given you a prescription for erythromycin gel.  If the white part of your eye 

becomes distinctly more red you need to stop going to work and start using the 

prescription.  If your eye stays the same as it is now you do not need to use 

the medication and you are safe to go to work.


Prescriptions: 


Erythromycin Base in Ethanol [Erythromycin 2% Gel] 1 applic OD Q4 5 Days #30 

gel..gm.


Forms:  Return to Work


Referrals: 


CHUCK NIELSEN MD [ACTIVE STAFF] - Follow up in 3-5 days

## 2020-04-01 NOTE — ER DOCUMENT REPORT
ED Eye Complaint





- General


Chief Complaint: Eye Problem


Stated Complaint: EYE IRRITATION


Time Seen by Provider: 04/01/20 08:44


Mode of Arrival: Ambulatory


Information source: Patient


TRAVEL OUTSIDE OF THE [place] IN LAST 30 DAYS: No





- HPI


Notes: 





Patient presents stating that she has had some eye pain and itchiness to the 

right eye.  This been going on for several days.  Nothing makes it better or 

worse.  She fears that this is allergies.  She states there is no significant 

radiation of the pain the pain is mild and intermittent.  She states she was sen

t here by her boss to make sure that she did not have "pinkeye".  No other 

symptoms.





- Related Data


Allergies/Adverse Reactions: 


                                        





No Known Allergies Allergy (Verified 04/01/20 08:31)


   











Past Medical History





- General


Information source: Patient





- Social History


Smoking Status: Never Smoker


Chew tobacco use (# tins/day): No


Frequency of alcohol use: None


Drug Abuse: None


Family History: Reviewed & Not Pertinent


Patient has suicidal ideation: No


Patient has homicidal ideation: No





- Past Medical History


Cardiac Medical History: 


   Denies: Hx Heart Attack, Hx Hypertension


Pulmonary Medical History: Reports: Hx Asthma


Neurological Medical History: Denies: Hx Cerebrovascular Accident, Hx Seizures


Renal/ Medical History: Denies: Hx Peritoneal Dialysis


GI Medical History: Denies: Hx Hepatitis, Hx Hiatal Hernia, Hx Ulcer


Musculoskeletal Medical History: Reports Hx Musculoskeletal Trauma


Psychiatric Medical History: Reports: Hx Attention Deficit Hyperactivity 

Disorder, Hx Bipolar Disorder, Hx Depression, Hx Post Traumatic Stress Disorder


Infectious Medical History: Denies: Hx Hepatitis


Past Surgical History: Reports: Hx Oral Surgery - wisdom, Hx Tonsillectomy.  

Denies: Hx Mastectomy, Hx Open Heart Surgery, Hx Pacemaker





- Immunizations


Immunizations up to date: Yes


Hx Diphtheria, Pertussis, Tetanus Vaccination: Yes





Review of Systems





- Review of Systems


Constitutional: denies: Chills, Fever


EENT: Eye pain, Eye discharge


Cardiovascular: denies: Chest pain, Palpitations


Respiratory: denies: Cough, Short of breath





Physical Exam





- Vital signs


Vitals: 





                                        











Temp Pulse Resp BP Pulse Ox


 


 98.2 F   101 H  18   140/79 H  100 


 


 04/01/20 08:16  04/01/20 08:16  04/01/20 08:16  04/01/20 08:16  04/01/20 08:16











Interpretation: Normal





- General


General appearance: Appears well


In distress: None





- HEENT


Head: Normocephalic, Atraumatic


Eyes: Normal


Conjunctiva: Normal


Cornea: Normal


Extraocular movements intact: Yes


Eyelashes: Normal


Pupils: PERRL





- Respiratory


Respiratory status: No respiratory distress


Breath sounds: Normal





- Psychological


Associated symptoms: Normal affect, Normal mood





- Skin


Skin Temperature: Warm


Skin Moisture: Dry


Skin Color: Normal





Course





- Vital Signs


Vital signs: 





                                        











Temp Pulse Resp BP Pulse Ox


 


 98.2 F   101 H  18   140/79 H  100 


 


 04/01/20 08:16  04/01/20 08:16  04/01/20 08:16  04/01/20 08:16  04/01/20 08:16














Discharge





- Discharge


Clinical Impression: 


Allergic conjunctivitis


Qualifiers:


 Laterality: right Qualified Code(s): H10.11 - Acute atopic conjunctivitis, 

right eye





Condition: Stable


Disposition: HOME, SELF-CARE


Instructions:  Conjunctivitis, Allergic


Additional Instructions: 


Your exam is most consistent with allergic conjunctivitis.  At this time I have 

given you a prescription for erythromycin gel.  If the white part of your eye 

becomes distinctly more red you need to stop going to work and start using the 

prescription.  If your eye stays the same as it is now you do not need to use 

the medication and you are safe to go to work.


Prescriptions: 


Erythromycin Base in Ethanol [Erythromycin 2% Gel] 1 applic OD Q4 5 Days #30 

gel..gm.


Forms:  Return to Work


Referrals: 


CHUCK NIELSEN MD [ACTIVE STAFF] - Follow up in 3-5 days

## 2020-04-04 ENCOUNTER — HOSPITAL ENCOUNTER (EMERGENCY)
Dept: HOSPITAL 62 - ER | Age: 20
LOS: 1 days | Discharge: HOME | End: 2020-04-05
Payer: MEDICAID

## 2020-04-04 VITALS — DIASTOLIC BLOOD PRESSURE: 70 MMHG | SYSTOLIC BLOOD PRESSURE: 119 MMHG

## 2020-04-04 DIAGNOSIS — S80.01XA: Primary | ICD-10-CM

## 2020-04-04 DIAGNOSIS — F17.200: ICD-10-CM
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
## 2020-04-04 DIAGNOSIS — M79.604: ICD-10-CM

## 2020-04-04 PROCEDURE — 99283 EMERGENCY DEPT VISIT LOW MDM: CPT

## 2020-04-04 PROCEDURE — 73590 X-RAY EXAM OF LOWER LEG: CPT

## 2020-04-04 PROCEDURE — 73564 X-RAY EXAM KNEE 4 OR MORE: CPT

## 2020-04-04 NOTE — RADIOLOGY REPORT (SQ)
EXAM DESCRIPTION: Two views of the right tibia and fibula



CLINICAL HISTORY: 19 years Female, right lower extremity pain

post into the side of a car. Pain.



COMPARISON: None.



FINDINGS: Bone mineralization is normal. The tibia and fibula are

intact. No fracture is seen. The knee and ankle are unremarkable.

Soft tissues are unremarkable.



IMPRESSION:

Normal radiographs of the right tibia and fibula.

## 2020-04-04 NOTE — RADIOLOGY REPORT (SQ)
EXAM DESCRIPTION: Four views of the right knee



CLINICAL HISTORY: 19 years Female, right lower extremity pain

post into the side of the vehicle. Anterior knee pain.



COMPARISON: None.



FINDINGS: Bone mineralization is normal. Alignment of the knee is

anatomic. No fracture. No degenerative arthritis. No effusion.

Soft tissues are unremarkable.



IMPRESSION:

Unremarkable radiographs of the right knee.
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
## 2020-04-20 ENCOUNTER — HOSPITAL ENCOUNTER (OUTPATIENT)
Dept: HOSPITAL 62 - RDC | Age: 20
End: 2020-04-20
Attending: NURSE PRACTITIONER
Payer: MEDICAID

## 2020-04-20 VITALS — SYSTOLIC BLOOD PRESSURE: 107 MMHG | DIASTOLIC BLOOD PRESSURE: 64 MMHG

## 2020-04-20 DIAGNOSIS — Z20.828: Primary | ICD-10-CM

## 2020-04-20 LAB
A TYPE INFLUENZA AG: NEGATIVE
B INFLUENZA AG: NEGATIVE

## 2020-04-20 PROCEDURE — 87804 INFLUENZA ASSAY W/OPTIC: CPT

## 2020-04-20 PROCEDURE — 87880 STREP A ASSAY W/OPTIC: CPT

## 2020-04-20 PROCEDURE — 87070 CULTURE OTHR SPECIMN AEROBIC: CPT

## 2020-04-20 NOTE — ER RDC ASSESSMENT REPORT
Intake





- In the Last 14 days


Have you traveled outside [place]?: No


--City/State: Recent travel to [place], [place] over past weekend


Have you been in close contact with someone CONFIRMED: No


Worked in Healthcare?: No





- Symptoms


Subjective Fever(Felt feverish): No


Chills: No


Muscule Aches: No


Runny Nose: No


Sore Throat: No


Cough (New or worsening chronic cough): No


Shortness of breath: No


Nausea or Vomiting: No


Headache: No


Abdominal Pain: No


Diarrhea(3 or more loose stools in last 24 hours): No





- Do you have any of the following


Chronic lung disease: Asthma or emphysema or COPD: Yes


Chronic Lung Disease Comment: 





asthma


Cystic Fibrosis: No


Diabetes: No


High Blood Pressure: No


Cardiovascular Disease: No


Chronic Kidney Disease: No


Chronic Liver Disease: No


Chronic blood disorder like Sickle Cell Disease: No


Weak immune system due to disease or medication: No


Neurologic condition that limits movement: No


Developmental delay - Moderate to Severe: No


Recent (within past 2 weeks) or current Pregnancy: No


Morbid Obesity (>100 pounds over ideal weight): No





- Objective


Temperature: 96.7 F


Pulse Rate: 91


Respiratory Rate: 14


Blood Pressure: 107/64


O2 Sat by Pulse Oximetry: 97


Objective: 


Patient states she traveled over past weekend to [place] and stayed in home of 

person who had close contact to conformed COVID19 case; currently asymptomatic 

but referred by ED for testing.





Disposition: Home; Selfcare





General





- General


Stated Complaint: seeking COVID testing after recent travel/staying with person 

who had exposure to positive case


Time Seen by Provider: 04/20/20 11:30


Mode of Arrival: Ambulatory


Information source: Patient





- HPI


Patient complains to provider of: asymptomatic


Quality of pain: No pain


Associated symptoms: None


Exacerbated by: Denies


Relieved by: Denies





- Related Data


Allergies/Adverse Reactions: 


                                        





prednisone Allergy (Verified 04/04/20 22:34)


   


ondansetron [From Zofran] Adverse Reaction (Verified 04/04/20 22:34)


   








Home Medications: zoloft





Past Medical History





- General


Information source: Patient





- Social History


Smoking Status: Current Every Day Smoker


Cigarette use (# per day): Yes - vapes frequently


Smoking Education Provided: Yes


Family History: Reviewed & Not Pertinent





- Past Medical History


Cardiac Medical History: Reports: None


   Denies: Hx Heart Attack, Hx Hypertension


Pulmonary Medical History: Reports: Hx Asthma


EENT Medical History: Reports: None


Neurological Medical History: Reports: None.  Denies: Hx Cerebrovascular 

Accident, Hx Seizures


Endocrine Medical History: Reports: None


Renal/ Medical History: Reports: None.  Denies: Hx Peritoneal Dialysis


Malignancy Medical History: Reports: None


GI Medical History: Reports: None.  Denies: Hx Hepatitis, Hx Hiatal Hernia, Hx 

Ulcer


Musculoskeletal Medical History: Reports None, Reports Hx Musculoskeletal Trauma


Skin Medical History: Reports None


Psychiatric Medical History: Reports: Hx Attention Deficit Hyperactivity 

Disorder, Hx Bipolar Disorder, Hx Depression, Hx Post Traumatic Stress Disorder


Traumatic Medical History: Reports: None


Infectious Medical History: Reports: None.  Denies: Hx Hepatitis


Past Surgical History: Reports: Hx Oral Surgery - wisdom, Hx Tonsillectomy.  

Denies: Hx Mastectomy, Hx Open Heart Surgery, Hx Pacemaker





Physical Exam





- Vital signs


Interpretation: Normal





- General


General appearance: Appears well, Alert


In distress: None


Notes: 





PHYSICAL EXAMINATION: 


GENERAL: Well-appearing and in no acute distress. 


HEAD: Atraumatic, normocephalic. 


EYES: sclera anicteric, conjunctiva are normal. 


ENT: nares patent. Moist mucous membranes. 


NECK: Normal range of motion, supple without lymphadenopathy 


LUNGS: CTAB and equal. No wheezes rales or rhonchi. 


HEART: Regular rate and rhythm without murmurs 


ABDOMEN: Soft, nontender, normal bowel sounds, no guarding. 


EXTREMITIES: Normal range of motion, no pitting edema. No cyanosis. 


NEUROLOGICAL: Cranial nerves grossly intact. Normal speech. 


PSYCH: Normal mood, normal affect. 


SKIN: Warm, Dry, normal turgor, no rashes or lesions noted








Patient Education/Counseling


Counseling/Education: 





Patient presents with recent contact with person exposed to Covid 19.  Patient 

does not have emergency worrying symptoms such as difficulty breathing, 

shortness of breath, chest pain, pressure, confusion or cyanosis.  Patient 

appears suitable for discharge.  Patient's vital signs are stable and patient is

nontoxic in appearance.  Good return precautions have been discussed with 

patient, patient verbalized understanding and is agreeable with discharge plan 

of care at this time.


Guidance for worsening S/SX: 





As a person under investigation for Covid 19, the [facility] of 

Health and Human Services, division of public health advises you to adhere to 

the following guidance until your test results are reported to you.  If your 

test result is positive, you will receive additional information from your 

provider and your local health department at that time.


 


Remain at home until you are cleared by the health provider or public health 

authorities.


 


Keep a log of visitors to your home, notify any visitors to your home of your 

isolation status.


 


If you plan to move to a new address or leave the county, notify the local 

health department in your County.


 


Call your doctor or seek care if you have an urgent medical need.  Before 

seeking medical care, call ahead to get instructions from the provider before 

arriving at the medical office clinic or hospital.  Notify them that you are 

being tested for the virus that causes Covid 19 so that arrangements can be 

made, as necessary, to prevent transmission to others in the healthcare setting.

 Next, notify the local health department in your county.


 


If a medical emergency arises and you need to call 911, inform the first 

responders that you are being tested for the virus that causes Covid 19.  Next, 

notify the local health department in your county.





RDC Discharge





- Discharge


Clinical Impression: 


 covid 19 screen





Condition: Good


Disposition: Home; Selfcare

## 2020-05-14 ENCOUNTER — HOSPITAL ENCOUNTER (EMERGENCY)
Dept: HOSPITAL 62 - ER | Age: 20
Discharge: HOME | End: 2020-05-14
Payer: MEDICAID

## 2020-05-14 VITALS — SYSTOLIC BLOOD PRESSURE: 121 MMHG | DIASTOLIC BLOOD PRESSURE: 62 MMHG

## 2020-05-14 DIAGNOSIS — F17.290: ICD-10-CM

## 2020-05-14 DIAGNOSIS — J02.9: ICD-10-CM

## 2020-05-14 DIAGNOSIS — J45.909: ICD-10-CM

## 2020-05-14 DIAGNOSIS — Z88.8: ICD-10-CM

## 2020-05-14 DIAGNOSIS — R09.81: ICD-10-CM

## 2020-05-14 DIAGNOSIS — R05: ICD-10-CM

## 2020-05-14 DIAGNOSIS — J06.9: Primary | ICD-10-CM

## 2020-05-14 DIAGNOSIS — B97.89: ICD-10-CM

## 2020-05-14 LAB
A TYPE INFLUENZA AG: NEGATIVE
B INFLUENZA AG: NEGATIVE

## 2020-05-14 PROCEDURE — 87880 STREP A ASSAY W/OPTIC: CPT

## 2020-05-14 PROCEDURE — 87070 CULTURE OTHR SPECIMN AEROBIC: CPT

## 2020-05-14 PROCEDURE — 99283 EMERGENCY DEPT VISIT LOW MDM: CPT

## 2020-05-14 PROCEDURE — 87804 INFLUENZA ASSAY W/OPTIC: CPT

## 2020-05-14 NOTE — ER DOCUMENT REPORT
Entered by MOHINDER THAKUR SCRIBE  05/14/20 1211 





Acting as scribe for:LORENA GROSS MD





ED General





- General


Chief Complaint: Sore Throat


Stated Complaint: SORE THROAT


Time Seen by Provider: 05/14/20 11:33


Mode of Arrival: Ambulatory


Information source: Patient


Notes: 





This 19-year-old female patient presents to the emergency department today with 

complaints of nasal congestion, sore throat, and a cough for the last few weeks.

 Patient states she has been seen for this in the past and was put on Claritin. 

Patient was tested for COVID-19 as well a few weeks ago which was negative. 

Patient states that she works at a [employer] and this exacerbates her cough. 

Patient denies fevers. 





TRAVEL OUTSIDE OF THE [place] IN LAST 30 DAYS: No





- Related Data


Allergies/Adverse Reactions: 


                                        





prednisone Allergy (Verified 04/04/20 22:34)


   


ondansetron [From Zofran] Adverse Reaction (Verified 04/04/20 22:34)


   











Past Medical History





- General


Information source: Patient





- Social History


Smoking Status: Current Every Day Smoker


Cigarette use (# per day): No - vapes


Chew tobacco use (# tins/day): No


Smoking Education Provided: No


Frequency of alcohol use: None


Drug Abuse: None


Lives with: Family


Family History: Reviewed & Not Pertinent


Patient has homicidal ideation: No


Pulmonary Medical History: Reports: Hx Asthma


Musculoskeletal Medical History: Reports Hx Musculoskeletal Trauma


Psychiatric Medical History: Reports: Hx Attention Deficit Hyperactivity 

Disorder, Hx Bipolar Disorder, Hx Depression, Hx Post Traumatic Stress Disorder


Past Surgical History: Reports: Hx Oral Surgery - wisdom, Hx Tonsillectomy





- Immunizations


Immunizations up to date: Yes


Hx Diphtheria, Pertussis, Tetanus Vaccination: Yes





Review of Systems





- Review of Systems


Constitutional: No symptoms reported


EENT: See HPI, Nose congestion, Throat pain


Cardiovascular: No symptoms reported


Respiratory: See HPI, Cough


Gastrointestinal: No symptoms reported


Genitourinary: No symptoms reported


Female Genitourinary: No symptoms reported


Musculoskeletal: No symptoms reported


Skin: No symptoms reported


Hematologic/Lymphatic: No symptoms reported


Neurological/Psychological: No symptoms reported


-: Yes All other systems reviewed and negative





Physical Exam





- Vital signs


Vitals: 


                                        











Temp Pulse Resp BP Pulse Ox


 


 98.2 F   75   18   128/80 H  98 


 


 05/14/20 11:20  05/14/20 11:20  05/14/20 11:20  05/14/20 11:20  05/14/20 11:20














- Notes


Notes: 





Physical Exam:


 


General: Alert, appears well. 


 


HEENT: Normocephalic. Atraumatic. PERRL. Extraocular movements intact. 

Oropharynx clear.  Nasal congestion.  No posterior oropharynx erythema or 

exudate.





Neck: Supple. Non-tender.


 


Respiratory: No respiratory distress. Rhonchi with forced cough.


 


Cardiovascular: Regular rate and rhythm. 


 


Abdominal: Normal Inspection. Non-tender. No distension. Normal Bowel Sounds. 


 


Back: No gross abnormalities. 


 


Extremities: Moves all four extremities.


Upper extremities: Normal inspection. Normal ROM.  


Lower extremities: Normal inspection. No edema. Normal ROM.


 


Neurological: Normal cognition. AAOx4. Normal speech.  


 


Psychological: Normal affect. Normal Mood. 


 


Skin: Warm. Dry. Normal color.





Course





- Vital Signs


Vital signs: 


                                        











Temp Pulse Resp BP Pulse Ox


 


 98.4 F   65   17   121/62   100 


 


 05/14/20 13:00  05/14/20 13:00  05/14/20 13:00  05/14/20 13:00  05/14/20 13:00














Discharge





- Discharge


Clinical Impression: 


 Viral upper respiratory tract infection with cough





Condition: Stable


Disposition: HOME, SELF-CARE


Additional Instructions: 


Upper Respiratory Illness





     You have a viral infection of the respiratory passages -- a "cold."  This 

common infection causes nasal congestion, drainage, and often sore throat and 

cough.  It is caused by a virus and is highly contagious.  The disease usually 

lasts a week or more, though the worst symptoms are usually over in 3 or 4 days.


     There is no "cure" for the viral infection -- it must run its course.  If 

there is a complication, such as bacterial infection in the nose, sinuses, 

middle ear, or bronchial tubes, antibiotics may be required, but antibiotics 

won't affect the virus.


     If you smoke, you should STOP!!  Drink plenty of fluids.  A humidifier may 

help.  An expectorant medication or decongestant may make you more comfortable. 

Use acetaminophen or ibuprofen for fever or aches.


     See the doctor if fever persists over two or three days, if there is any 

significant worsening of your symptoms, or if you simply fail to improve as 

expected.





****************************************************************************

**********************************************





Take medications as prescribed to help control your cough.


Try taking the generic version of Delsym DM for additional cough suppression.


Drink plenty of fluids and get plenty of rest.


Take Tylenol and ibuprofen for sore throat.


Continue taking your allergy medications, as this will probably help the nasal 

drainage some.


Follow-up with your primary care provider if not improving over the next 1 to 2 

weeks.





RETURN TO THE EMERGENCY ROOM IF ANY NEW OR WORSENING SYMPTOMS.








Prescriptions: 


Benzonatate [Tessalon Perles 100 mg Capsule] 100 mg PO ASDIR PRN #30 capsule


 PRN Reason: 


Forms:  Return to Work





I personally performed the services described in the documentation, reviewed and

edited the documentation which was dictated to the scribe in my presence, and it

accurately records my words and actions.

## 2020-08-17 ENCOUNTER — HOSPITAL ENCOUNTER (EMERGENCY)
Dept: HOSPITAL 62 - ER | Age: 20
Discharge: HOME | End: 2020-08-17
Payer: MEDICAID

## 2020-08-17 VITALS — SYSTOLIC BLOOD PRESSURE: 110 MMHG | DIASTOLIC BLOOD PRESSURE: 59 MMHG

## 2020-08-17 DIAGNOSIS — R11.2: Primary | ICD-10-CM

## 2020-08-17 DIAGNOSIS — J45.909: ICD-10-CM

## 2020-08-17 DIAGNOSIS — Z88.8: ICD-10-CM

## 2020-08-17 DIAGNOSIS — M54.5: ICD-10-CM

## 2020-08-17 DIAGNOSIS — F17.290: ICD-10-CM

## 2020-08-17 DIAGNOSIS — Z79.899: ICD-10-CM

## 2020-08-17 LAB
ADD MANUAL DIFF: NO
ALBUMIN SERPL-MCNC: 4.6 G/DL (ref 3.5–5)
ALP SERPL-CCNC: 81 U/L (ref 38–126)
ANION GAP SERPL CALC-SCNC: 8 MMOL/L (ref 5–19)
APPEARANCE UR: (no result)
APTT PPP: YELLOW S
AST SERPL-CCNC: 23 U/L (ref 14–36)
BASOPHILS # BLD AUTO: 0 10^3/UL (ref 0–0.2)
BASOPHILS NFR BLD AUTO: 0.6 % (ref 0–2)
BILIRUB DIRECT SERPL-MCNC: 0 MG/DL (ref 0–0.4)
BILIRUB SERPL-MCNC: 0.4 MG/DL (ref 0.2–1.3)
BILIRUB UR QL STRIP: NEGATIVE
BUN SERPL-MCNC: 14 MG/DL (ref 7–20)
CALCIUM: 9.7 MG/DL (ref 8.4–10.2)
CHLORIDE SERPL-SCNC: 103 MMOL/L (ref 98–107)
CO2 SERPL-SCNC: 27 MMOL/L (ref 22–30)
EOSINOPHIL # BLD AUTO: 0.1 10^3/UL (ref 0–0.6)
EOSINOPHIL NFR BLD AUTO: 1.3 % (ref 0–6)
ERYTHROCYTE [DISTWIDTH] IN BLOOD BY AUTOMATED COUNT: 13.8 % (ref 11.5–14)
GLUCOSE SERPL-MCNC: 89 MG/DL (ref 75–110)
GLUCOSE UR STRIP-MCNC: NEGATIVE MG/DL
HCT VFR BLD CALC: 42.1 % (ref 36–47)
HGB BLD-MCNC: 14.3 G/DL (ref 12–15.5)
KETONES UR STRIP-MCNC: NEGATIVE MG/DL
LYMPHOCYTES # BLD AUTO: 2.4 10^3/UL (ref 0.5–4.7)
LYMPHOCYTES NFR BLD AUTO: 42.3 % (ref 13–45)
MCH RBC QN AUTO: 30.1 PG (ref 27–33.4)
MCHC RBC AUTO-ENTMCNC: 34 G/DL (ref 32–36)
MCV RBC AUTO: 88 FL (ref 80–97)
MONOCYTES # BLD AUTO: 0.4 10^3/UL (ref 0.1–1.4)
MONOCYTES NFR BLD AUTO: 7.8 % (ref 3–13)
NEUTROPHILS # BLD AUTO: 2.7 10^3/UL (ref 1.7–8.2)
NEUTS SEG NFR BLD AUTO: 48 % (ref 42–78)
NITRITE UR QL STRIP: NEGATIVE
PH UR STRIP: 5 [PH] (ref 5–9)
PLATELET # BLD: 222 10^3/UL (ref 150–450)
POTASSIUM SERPL-SCNC: 4.2 MMOL/L (ref 3.6–5)
PROT SERPL-MCNC: 7.9 G/DL (ref 6.3–8.2)
PROT UR STRIP-MCNC: NEGATIVE MG/DL
RBC # BLD AUTO: 4.76 10^6/UL (ref 3.72–5.28)
SP GR UR STRIP: 1.03
TOTAL CELLS COUNTED % (AUTO): 100 %
UROBILINOGEN UR-MCNC: NEGATIVE MG/DL (ref ?–2)
WBC # BLD AUTO: 5.6 10^3/UL (ref 4–10.5)

## 2020-08-17 PROCEDURE — 85025 COMPLETE CBC W/AUTO DIFF WBC: CPT

## 2020-08-17 PROCEDURE — 36415 COLL VENOUS BLD VENIPUNCTURE: CPT

## 2020-08-17 PROCEDURE — 81001 URINALYSIS AUTO W/SCOPE: CPT

## 2020-08-17 PROCEDURE — 99283 EMERGENCY DEPT VISIT LOW MDM: CPT

## 2020-08-17 PROCEDURE — 84703 CHORIONIC GONADOTROPIN ASSAY: CPT

## 2020-08-17 PROCEDURE — 80053 COMPREHEN METABOLIC PANEL: CPT

## 2020-08-17 NOTE — ER DOCUMENT REPORT
Entered by MOHINDER THAKUR SCRIBE  08/17/20 1113 





Acting as scribe for:LORENA GROSS MD





ED GI/





- General


Chief Complaint: Nausea/Vomiting


Stated Complaint: VOMITING,BACK PAIN


Time Seen by Provider: 08/17/20 11:12


Information source: Patient


Notes: 





This 19 year old female patient presents to the emergency department today with 

complaints of her menstrual period being 13 days late. Patient reports that 

there is a possibility that she is pregnant. She reports she has low back pain 

and vomiting for one week. 





TRAVEL OUTSIDE OF THE [place] IN LAST 30 DAYS: No





- Related Data


Allergies/Adverse Reactions: 


                                        





prednisone Allergy (Verified 04/04/20 22:34)


   


ondansetron [From Zofran] Adverse Reaction (Verified 04/04/20 22:34)


   








Home Medications: Zoloft, Vivance





Past Medical History





- General


Information source: Patient





- Social History


Smoking Status: Current Every Day Smoker


Cigarette use (# per day): No - vape


Frequency of alcohol use: None


Drug Abuse: None


Lives with: Spouse/Significant other


Family History: Reviewed & Not Pertinent


Pulmonary Medical History: Reports: Hx Asthma


Musculoskeletal Medical History: Reports Hx Musculoskeletal Trauma


Psychiatric Medical History: Reports: Hx Attention Deficit Hyperactivity 

Disorder, Hx Bipolar Disorder, Hx Depression, Hx Post Traumatic Stress Disorder


Past Surgical History: Reports: Hx Adenoidectomy, Hx Oral Surgery - wisdom, Hx 

Tonsillectomy





- Immunizations


Immunizations up to date: Yes


Hx Diphtheria, Pertussis, Tetanus Vaccination: Yes





Review of Systems





- Review of Systems


Constitutional: No symptoms reported


EENT: No symptoms reported


Cardiovascular: No symptoms reported


Respiratory: No symptoms reported


Gastrointestinal: See HPI, Vomiting


Genitourinary: No symptoms reported


Female Genitourinary: See HPI, Last menstrual period - "13 days late", Pregnant 

- possible


Musculoskeletal: See HPI, Back pain


Skin: No symptoms reported


Hematologic/Lymphatic: No symptoms reported


Neurological/Psychological: No symptoms reported


-: Yes All other systems reviewed and negative





Physical Exam





- Vital signs


Vitals: 


                                        











Temp Pulse Resp BP Pulse Ox


 


 98.4 F   66   18   118/57 L  98 


 


 08/17/20 09:26  08/17/20 09:26  08/17/20 09:26  08/17/20 09:26  08/17/20 09:26














- Notes


Notes: 





Physical Exam:


 


General: Alert, appears well. 


 


HEENT: Normocephalic. Atraumatic. PERRL. Extraocular movements intact. 

Oropharynx clear.


 


Neck: Supple. Non-tender.


 


Respiratory: No respiratory distress. Clear and equal breath sounds bilaterally.


 


Cardiovascular: Regular rate and rhythm. 


 


Abdominal: Obese. Non-tender. No distension. Normal Bowel Sounds. 


 


Back: Lumbar and sacral paraspinal musculature tenderness with palpation. No 

gross abnormalities. 


 


Extremities: Moves all four extremities.


Upper extremities: Normal inspection. Normal ROM.  


Lower extremities: Normal inspection. No edema. Normal ROM.


 


Neurological: Normal cognition. AAOx4. Normal speech.  


 


Psychological: Normal affect. Normal Mood. 


 


Skin: Warm. Dry. Normal color.





Course





- Vital Signs


Vital signs: 


                                        











Temp Pulse Resp BP Pulse Ox


 


 98.4 F   66   18   118/57 L  98 


 


 08/17/20 09:26  08/17/20 09:26  08/17/20 09:26  08/17/20 09:26  08/17/20 09:26














- Laboratory


Result Diagrams: 


                                 08/17/20 10:26





                                 08/17/20 10:26


Laboratory results interpreted by me: 


                                        











  08/17/20





  09:50


 


Urine Blood  SMALL H


 


Ur Leukocyte Esterase  TRACE H














Discharge





- Discharge


Clinical Impression: 


Nausea and vomiting


Qualifiers:


 Vomiting type: unspecified Vomiting Intractability: non-intractable Qualified 

Code(s): R11.2 - Nausea with vomiting, unspecified





Low back pain


Qualifiers:


 Chronicity: unspecified Back pain laterality: bilateral Sciatica presence: 

without sciatica Qualified Code(s): M54.5 - Low back pain





Condition: Stable


Disposition: HOME, SELF-CARE


Additional Instructions: 


Nausea or Vomiting, Nonspecific





     Vomiting (or nausea without vomiting) can be caused by many different 

problems. Of course, it can mean that something's wrong with the stomach, such 

as "stomach flu," ulcers, or inflammation. But it can also be a symptom of a 

problem that has nothing to do with the stomach or intestines. Vomiting is 

common with severe headaches, earaches, and tonsillitis. We see it with 

pneumonia or heart attacks. Drugs can cause nausea. Many abdominal problems 

cause vomiting; for example, gallstones, kidney stones, pancreatitis, and 

intestinal obstruction (blocked bowels).


     In most cases, curing the vomiting depends on fixing the problem that 

caused it. For temporary relief, we may use an anti-nausea medicine. For home 

use, we can prescribe suppositories, chewable pills, pills that dissolve in the 

mouth, or liquid anti-nausea drugs. If the vomiting seems to be caused by a 

problem in the stomach, acid-suppressing drugs may be prescribed as well.


     It's important to avoid dehydration. Sip clear liquids. Take increasing 

amounts of fluid over the first 24 hours. Then start small amounts of bland 

foods (such as dry toast, applesauce, mashed potato). Avoid aspirin, tobacco, 

and alcohol. Gradually resume your usual diet.


     If the vomiting worsens, if the problem that's making you vomit worsens, or

if there's evidence of bleeding in the stomach (such as black, tarry stool, 

bloody or black vomit, or lightheadedness), you should return immediately. Call 

your doctor if you aren't improved in 24 to 36 hours.








Low Back Pain





     Three out of every four people will have an episode of disabling back pain 

during their lifetime.  Most commonly the pain is due to straining of the 

muscles and ligaments in the low back.


     Usual treatment includes: 


(1) Rest on a firm surface.  Avoid lying on your stomach.  


(2) Ice pack the painful area.  After a few days, gentle heat may be used 

intermittently to relax the area, or ice packs can be continued.  


(3) Medication may be needed -- muscle relaxers and antiinflammatory medicines 

are commonly used.  


(4) As the back improves, exercises are prescribed to strengthen the back and 

abdominal muscles.


     Your doctor will advise you on the proper care for your back at each stage 

in your recovery.  You may be better in a few days -- or healing may take 

several weeks.


     If new symptoms of a "herniated disc" (radiation of pain, numbness, or 

tingling down the back of the leg or weakness in the leg) occur, you should be 

re-examined.  Further testing may be necessary.








*****

********************************************************************************


***********************************





A serum hCG pregnancy test today was negative.





Take medication as prescribed for nausea if needed.


Drink plenty of cool clear liquids throughout the day.  


Take Tylenol and ibuprofen for your low back pain as needed.


Follow-up with your primary care provider if not improving.





RETURN TO THE EMERGENCY ROOM IF ANY NEW OR WORSENING SYMPTOMS.








Prescriptions: 


Metoclopramide HCl [Reglan 10 mg Tablet] 1 tab PO ASDIR PRN #15 tablet


 PRN Reason: 





I personally performed the services described in the documentation, reviewed and

edited the documentation which was dictated to the scribe in my presence, and it

accurately records my words and actions.